# Patient Record
Sex: FEMALE | Race: WHITE | NOT HISPANIC OR LATINO | URBAN - METROPOLITAN AREA
[De-identification: names, ages, dates, MRNs, and addresses within clinical notes are randomized per-mention and may not be internally consistent; named-entity substitution may affect disease eponyms.]

---

## 2017-05-02 ENCOUNTER — INPATIENT (INPATIENT)
Facility: HOSPITAL | Age: 27
LOS: 2 days | Discharge: HOME | End: 2017-05-05
Attending: OBSTETRICS & GYNECOLOGY | Admitting: OBSTETRICS & GYNECOLOGY

## 2017-06-28 DIAGNOSIS — O48.0 POST-TERM PREGNANCY: ICD-10-CM

## 2017-06-28 DIAGNOSIS — Z33.1 PREGNANT STATE, INCIDENTAL: ICD-10-CM

## 2017-06-28 DIAGNOSIS — Z3A.40 40 WEEKS GESTATION OF PREGNANCY: ICD-10-CM

## 2018-03-28 ENCOUNTER — TRANSCRIPTION ENCOUNTER (OUTPATIENT)
Age: 28
End: 2018-03-28

## 2019-05-08 ENCOUNTER — APPOINTMENT (OUTPATIENT)
Dept: ANTEPARTUM | Facility: CLINIC | Age: 29
End: 2019-05-08
Payer: COMMERCIAL

## 2019-05-08 ENCOUNTER — TRANSCRIPTION ENCOUNTER (OUTPATIENT)
Age: 29
End: 2019-05-08

## 2019-05-08 ENCOUNTER — OUTPATIENT (OUTPATIENT)
Dept: OUTPATIENT SERVICES | Facility: HOSPITAL | Age: 29
LOS: 1 days | Discharge: HOME | End: 2019-05-08

## 2019-05-08 DIAGNOSIS — Z36.3 ENCOUNTER FOR ANTENATAL SCREENING FOR MALFORMATIONS: ICD-10-CM

## 2019-05-08 PROCEDURE — 76811 OB US DETAILED SNGL FETUS: CPT | Mod: 26

## 2019-09-23 ENCOUNTER — INPATIENT (INPATIENT)
Facility: HOSPITAL | Age: 29
LOS: 2 days | Discharge: HOME | End: 2019-09-26
Attending: OBSTETRICS & GYNECOLOGY | Admitting: OBSTETRICS & GYNECOLOGY

## 2019-09-23 VITALS — DIASTOLIC BLOOD PRESSURE: 79 MMHG | SYSTOLIC BLOOD PRESSURE: 124 MMHG | HEART RATE: 95 BPM | OXYGEN SATURATION: 97 %

## 2019-09-23 DIAGNOSIS — Z98.890 OTHER SPECIFIED POSTPROCEDURAL STATES: Chronic | ICD-10-CM

## 2019-09-23 LAB
APPEARANCE UR: ABNORMAL
BACTERIA # UR AUTO: ABNORMAL
BASOPHILS # BLD AUTO: 0.03 K/UL — SIGNIFICANT CHANGE UP (ref 0–0.2)
BASOPHILS NFR BLD AUTO: 0.3 % — SIGNIFICANT CHANGE UP (ref 0–1)
BILIRUB UR-MCNC: NEGATIVE — SIGNIFICANT CHANGE UP
BLD GP AB SCN SERPL QL: SIGNIFICANT CHANGE UP
COLOR SPEC: YELLOW — SIGNIFICANT CHANGE UP
DIFF PNL FLD: NEGATIVE — SIGNIFICANT CHANGE UP
EOSINOPHIL # BLD AUTO: 0.13 K/UL — SIGNIFICANT CHANGE UP (ref 0–0.7)
EOSINOPHIL NFR BLD AUTO: 1.4 % — SIGNIFICANT CHANGE UP (ref 0–8)
EPI CELLS # UR: >27 /HPF — HIGH (ref 0–5)
GLUCOSE UR QL: NEGATIVE — SIGNIFICANT CHANGE UP
HCT VFR BLD CALC: 37.2 % — SIGNIFICANT CHANGE UP (ref 37–47)
HGB BLD-MCNC: 12.6 G/DL — SIGNIFICANT CHANGE UP (ref 12–16)
HYALINE CASTS # UR AUTO: 6 /LPF — SIGNIFICANT CHANGE UP (ref 0–7)
IMM GRANULOCYTES NFR BLD AUTO: 1.2 % — HIGH (ref 0.1–0.3)
KETONES UR-MCNC: NEGATIVE — SIGNIFICANT CHANGE UP
LEUKOCYTE ESTERASE UR-ACNC: NEGATIVE — SIGNIFICANT CHANGE UP
LYMPHOCYTES # BLD AUTO: 3.14 K/UL — SIGNIFICANT CHANGE UP (ref 1.2–3.4)
LYMPHOCYTES # BLD AUTO: 33.9 % — SIGNIFICANT CHANGE UP (ref 20.5–51.1)
MCHC RBC-ENTMCNC: 29.5 PG — SIGNIFICANT CHANGE UP (ref 27–31)
MCHC RBC-ENTMCNC: 33.9 G/DL — SIGNIFICANT CHANGE UP (ref 32–37)
MCV RBC AUTO: 87.1 FL — SIGNIFICANT CHANGE UP (ref 81–99)
MONOCYTES # BLD AUTO: 0.58 K/UL — SIGNIFICANT CHANGE UP (ref 0.1–0.6)
MONOCYTES NFR BLD AUTO: 6.3 % — SIGNIFICANT CHANGE UP (ref 1.7–9.3)
NEUTROPHILS # BLD AUTO: 5.26 K/UL — SIGNIFICANT CHANGE UP (ref 1.4–6.5)
NEUTROPHILS NFR BLD AUTO: 56.9 % — SIGNIFICANT CHANGE UP (ref 42.2–75.2)
NITRITE UR-MCNC: NEGATIVE — SIGNIFICANT CHANGE UP
NRBC # BLD: 0 /100 WBCS — SIGNIFICANT CHANGE UP (ref 0–0)
PH UR: 7 — SIGNIFICANT CHANGE UP (ref 5–8)
PLATELET # BLD AUTO: 178 K/UL — SIGNIFICANT CHANGE UP (ref 130–400)
PRENATAL SYPHILIS TEST: SIGNIFICANT CHANGE UP
PROT UR-MCNC: SIGNIFICANT CHANGE UP
RBC # BLD: 4.27 M/UL — SIGNIFICANT CHANGE UP (ref 4.2–5.4)
RBC # FLD: 13.5 % — SIGNIFICANT CHANGE UP (ref 11.5–14.5)
RBC CASTS # UR COMP ASSIST: 1 /HPF — SIGNIFICANT CHANGE UP (ref 0–4)
SP GR SPEC: 1.02 — SIGNIFICANT CHANGE UP (ref 1.01–1.02)
UROBILINOGEN FLD QL: SIGNIFICANT CHANGE UP
WBC # BLD: 9.25 K/UL — SIGNIFICANT CHANGE UP (ref 4.8–10.8)
WBC # FLD AUTO: 9.25 K/UL — SIGNIFICANT CHANGE UP (ref 4.8–10.8)
WBC UR QL: 7 /HPF — HIGH (ref 0–5)

## 2019-09-23 RX ORDER — SODIUM CHLORIDE 9 MG/ML
1000 INJECTION, SOLUTION INTRAVENOUS
Refills: 0 | Status: DISCONTINUED | OUTPATIENT
Start: 2019-09-23 | End: 2019-09-24

## 2019-09-23 RX ORDER — SODIUM CHLORIDE 0.65 %
1 AEROSOL, SPRAY (ML) NASAL
Refills: 0 | Status: DISCONTINUED | OUTPATIENT
Start: 2019-09-23 | End: 2019-09-26

## 2019-09-23 RX ORDER — DINOPROSTONE 10 MG/241MG
10 INSERT VAGINAL ONCE
Refills: 0 | Status: COMPLETED | OUTPATIENT
Start: 2019-09-23 | End: 2019-09-23

## 2019-09-23 RX ORDER — OXYTOCIN 10 UNIT/ML
333.33 VIAL (ML) INJECTION
Qty: 20 | Refills: 0 | Status: COMPLETED | OUTPATIENT
Start: 2019-09-23 | End: 2019-09-24

## 2019-09-23 RX ORDER — OXYTOCIN 10 UNIT/ML
1 VIAL (ML) INJECTION
Qty: 30 | Refills: 0 | Status: DISCONTINUED | OUTPATIENT
Start: 2019-09-23 | End: 2019-09-24

## 2019-09-23 RX ADMIN — SODIUM CHLORIDE 125 MILLILITER(S): 9 INJECTION, SOLUTION INTRAVENOUS at 22:07

## 2019-09-23 RX ADMIN — Medication 1 SPRAY(S): at 22:21

## 2019-09-23 RX ADMIN — DINOPROSTONE 10 MILLIGRAM(S): 10 INSERT VAGINAL at 21:50

## 2019-09-23 NOTE — OB PROVIDER H&P - ASSESSMENT
30yo  at 40w2d by LMP c/w first trim sono per patient, GBS neg per patient, here for elective IOL elective, left fetal pyelectasis, HSV I pos serology no outbreaks    - Admit  - IV fluids  - Clears  - Cont toco and efm  - Pain mgt prn  - Admission labs  - Bedrest with bathroom privileges    Dr. Briceño to be made aware

## 2019-09-23 NOTE — OB PROVIDER H&P - HISTORY OF PRESENT ILLNESS
30yo  at 40w2d here for IOL elective, GBS neg. Denies ctx, vb, lof. Good FM. Fetus was found to have left pyelectasis this pregnancy, no other complications. 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

## 2019-09-23 NOTE — OB PROVIDER H&P - NSHPPHYSICALEXAM_GEN_ALL_CORE
Vital Signs Last 24 Hrs  T(C): 36.9 (23 Sep 2019 21:00), Max: 36.9 (23 Sep 2019 21:00)  T(F): 98.4 (23 Sep 2019 21:00), Max: 98.4 (23 Sep 2019 21:00)  HR: 90 (23 Sep 2019 21:20) (81 - 92)  BP: 124/79 (23 Sep 2019 21:00) (124/79 - 124/79)  RR: 18 (23 Sep 2019 21:00) (18 - 18)  SpO2: 97% (23 Sep 2019 21:20) (97% - 99%)    Gen: NAD, A&OX3  Abd: Gravid, soft, NT  VE: 1.5/70/-2, vtx, intact

## 2019-09-23 NOTE — CHART NOTE - NSCHARTNOTEFT_GEN_A_CORE
Monday 09/23/2019  22:02    Patient's chart, notes and labs reviewed.  IOL with Cervidil.                        12.6   9.25  )-----------( 178      ( 23 Sep 2019 21:00 )             37.2   Admission labs acceptable for consideration of Neuraxial Nerve Block for Labor Analgesia/Anesthesia.  Will remain immediately available.

## 2019-09-23 NOTE — OB RN PATIENT PROFILE - FAMILY HISTORY
Mother  Still living? Unknown  Family history of diabetes mellitus, Age at diagnosis: Age Unknown     Grandparent  Still living? Unknown  Family history of skin cancer, Age at diagnosis: Age Unknown

## 2019-09-23 NOTE — OB PROVIDER H&P - NS_OBGYNHISTORY_OBGYN_ALL_OB_FT
OB:  G1 5/3/17 40w6d  Male 5ym47rm [facility] uncomplicated    GYN:   Denies abnormal paps, fibroids, cysts, STIs

## 2019-09-24 LAB
AMPHET UR-MCNC: NEGATIVE — SIGNIFICANT CHANGE UP
BARBITURATES UR SCN-MCNC: NEGATIVE — SIGNIFICANT CHANGE UP
BENZODIAZ UR-MCNC: NEGATIVE — SIGNIFICANT CHANGE UP
BUPRENORPHINE SCREEN, URINE RESULT: NEGATIVE — SIGNIFICANT CHANGE UP
COCAINE METAB.OTHER UR-MCNC: NEGATIVE — SIGNIFICANT CHANGE UP
HIV 1+2 AB+HIV1 P24 AG SERPL QL IA: SIGNIFICANT CHANGE UP
L&D DRUG SCREEN, URINE: SIGNIFICANT CHANGE UP
METHADONE UR-MCNC: NEGATIVE — SIGNIFICANT CHANGE UP
OPIATES UR-MCNC: NEGATIVE — SIGNIFICANT CHANGE UP
OXYCODONE UR-MCNC: NEGATIVE — SIGNIFICANT CHANGE UP
PCP UR-MCNC: NEGATIVE — SIGNIFICANT CHANGE UP
PROPOXYPHENE QUALITATIVE URINE RESULT: NEGATIVE — SIGNIFICANT CHANGE UP

## 2019-09-24 RX ORDER — IBUPROFEN 200 MG
600 TABLET ORAL EVERY 6 HOURS
Refills: 0 | Status: COMPLETED | OUTPATIENT
Start: 2019-09-24 | End: 2020-08-22

## 2019-09-24 RX ORDER — NALOXONE HYDROCHLORIDE 4 MG/.1ML
0.1 SPRAY NASAL
Refills: 0 | Status: DISCONTINUED | OUTPATIENT
Start: 2019-09-24 | End: 2019-09-26

## 2019-09-24 RX ORDER — LANOLIN
1 OINTMENT (GRAM) TOPICAL EVERY 6 HOURS
Refills: 0 | Status: DISCONTINUED | OUTPATIENT
Start: 2019-09-24 | End: 2019-09-26

## 2019-09-24 RX ORDER — HYDROCORTISONE 1 %
1 OINTMENT (GRAM) TOPICAL EVERY 6 HOURS
Refills: 0 | Status: DISCONTINUED | OUTPATIENT
Start: 2019-09-24 | End: 2019-09-26

## 2019-09-24 RX ORDER — OXYCODONE HYDROCHLORIDE 5 MG/1
5 TABLET ORAL ONCE
Refills: 0 | Status: DISCONTINUED | OUTPATIENT
Start: 2019-09-24 | End: 2019-09-26

## 2019-09-24 RX ORDER — PRAMOXINE HYDROCHLORIDE 150 MG/15G
1 AEROSOL, FOAM RECTAL EVERY 4 HOURS
Refills: 0 | Status: DISCONTINUED | OUTPATIENT
Start: 2019-09-24 | End: 2019-09-26

## 2019-09-24 RX ORDER — KETOROLAC TROMETHAMINE 30 MG/ML
30 SYRINGE (ML) INJECTION ONCE
Refills: 0 | Status: DISCONTINUED | OUTPATIENT
Start: 2019-09-24 | End: 2019-09-24

## 2019-09-24 RX ORDER — GLYCERIN ADULT
1 SUPPOSITORY, RECTAL RECTAL AT BEDTIME
Refills: 0 | Status: DISCONTINUED | OUTPATIENT
Start: 2019-09-24 | End: 2019-09-26

## 2019-09-24 RX ORDER — DOCUSATE SODIUM 100 MG
100 CAPSULE ORAL
Refills: 0 | Status: DISCONTINUED | OUTPATIENT
Start: 2019-09-24 | End: 2019-09-26

## 2019-09-24 RX ORDER — BENZOCAINE 10 %
1 GEL (GRAM) MUCOUS MEMBRANE EVERY 6 HOURS
Refills: 0 | Status: DISCONTINUED | OUTPATIENT
Start: 2019-09-24 | End: 2019-09-26

## 2019-09-24 RX ORDER — MAGNESIUM HYDROXIDE 400 MG/1
30 TABLET, CHEWABLE ORAL
Refills: 0 | Status: DISCONTINUED | OUTPATIENT
Start: 2019-09-24 | End: 2019-09-26

## 2019-09-24 RX ORDER — DIBUCAINE 1 %
1 OINTMENT (GRAM) RECTAL EVERY 6 HOURS
Refills: 0 | Status: DISCONTINUED | OUTPATIENT
Start: 2019-09-24 | End: 2019-09-26

## 2019-09-24 RX ORDER — IBUPROFEN 200 MG
600 TABLET ORAL EVERY 6 HOURS
Refills: 0 | Status: DISCONTINUED | OUTPATIENT
Start: 2019-09-24 | End: 2019-09-26

## 2019-09-24 RX ORDER — OXYCODONE HYDROCHLORIDE 5 MG/1
5 TABLET ORAL
Refills: 0 | Status: DISCONTINUED | OUTPATIENT
Start: 2019-09-24 | End: 2019-09-26

## 2019-09-24 RX ORDER — DIPHENHYDRAMINE HCL 50 MG
25 CAPSULE ORAL EVERY 6 HOURS
Refills: 0 | Status: DISCONTINUED | OUTPATIENT
Start: 2019-09-24 | End: 2019-09-26

## 2019-09-24 RX ORDER — ACETAMINOPHEN 500 MG
975 TABLET ORAL
Refills: 0 | Status: DISCONTINUED | OUTPATIENT
Start: 2019-09-24 | End: 2019-09-26

## 2019-09-24 RX ORDER — AER TRAVELER 0.5 G/1
1 SOLUTION RECTAL; TOPICAL EVERY 4 HOURS
Refills: 0 | Status: DISCONTINUED | OUTPATIENT
Start: 2019-09-24 | End: 2019-09-26

## 2019-09-24 RX ORDER — SIMETHICONE 80 MG/1
80 TABLET, CHEWABLE ORAL EVERY 4 HOURS
Refills: 0 | Status: DISCONTINUED | OUTPATIENT
Start: 2019-09-24 | End: 2019-09-26

## 2019-09-24 RX ORDER — SODIUM CHLORIDE 9 MG/ML
3 INJECTION INTRAMUSCULAR; INTRAVENOUS; SUBCUTANEOUS EVERY 8 HOURS
Refills: 0 | Status: DISCONTINUED | OUTPATIENT
Start: 2019-09-24 | End: 2019-09-26

## 2019-09-24 RX ORDER — ONDANSETRON 8 MG/1
4 TABLET, FILM COATED ORAL EVERY 6 HOURS
Refills: 0 | Status: DISCONTINUED | OUTPATIENT
Start: 2019-09-24 | End: 2019-09-26

## 2019-09-24 RX ADMIN — Medication 1000 MILLIUNIT(S)/MIN: at 10:18

## 2019-09-24 RX ADMIN — SODIUM CHLORIDE 3 MILLILITER(S): 9 INJECTION INTRAMUSCULAR; INTRAVENOUS; SUBCUTANEOUS at 21:56

## 2019-09-24 RX ADMIN — Medication 975 MILLIGRAM(S): at 21:56

## 2019-09-24 RX ADMIN — SODIUM CHLORIDE 3 MILLILITER(S): 9 INJECTION INTRAMUSCULAR; INTRAVENOUS; SUBCUTANEOUS at 13:12

## 2019-09-24 RX ADMIN — Medication 975 MILLIGRAM(S): at 13:12

## 2019-09-24 RX ADMIN — Medication 30 MILLIGRAM(S): at 10:34

## 2019-09-24 RX ADMIN — SODIUM CHLORIDE 125 MILLILITER(S): 9 INJECTION, SOLUTION INTRAVENOUS at 02:13

## 2019-09-24 RX ADMIN — Medication 100 MILLIGRAM(S): at 21:57

## 2019-09-24 RX ADMIN — Medication 30 MILLIGRAM(S): at 11:23

## 2019-09-24 RX ADMIN — Medication 600 MILLIGRAM(S): at 18:20

## 2019-09-24 NOTE — PROGRESS NOTE ADULT - SUBJECTIVE AND OBJECTIVE BOX
PGY 1 Note    Patient seen at bedside for evaluation of labor progression.  Reports very painful contractions and desire epidural for pain management.  Denies any other symptoms.    ICU Vital Signs Last 24 Hrs  T(C): 36.4 (24 Sep 2019 01:47), Max: 36.9 (23 Sep 2019 21:00)  T(F): 97.52 (24 Sep 2019 01:47), Max: 98.4 (23 Sep 2019 21:00)  HR: 92 (24 Sep 2019 04:21) (49 - 102)  BP: 128/84 (24 Sep 2019 04:20) (98/53 - 133/84)  RR: 18 (24 Sep 2019 01:47) (18 - 18)  SpO2: 99% (24 Sep 2019 04:21) (96% - 100%)      EFM: 110/mod/+accel, cat I  TOCO: q2m  SVE: 3.5/90/-2, vertex, intact    Labs:                        12.6   9.25  )-----------( 178      ( 23 Sep 2019 21:00 )             37.2           ABO RH Interpretation: A POS (19 @ 21:00)  antibody neg  Urinalysis Basic - ( 23 Sep 2019 21:30 )    Color: Yellow / Appearance: Slightly Turbid / S.019 / pH: x  Gluc: x / Ketone: Negative  / Bili: Negative / Urobili: <2 mg/dL   Blood: x / Protein: Trace / Nitrite: Negative   Leuk Esterase: Negative / RBC: 1 /HPF / WBC 7 /HPF   Sq Epi: x / Non Sq Epi: >27 /HPF / Bacteria: Moderate    Syphilis NR      Meds: cervidil in @2720, removed @7924

## 2019-09-24 NOTE — PROGRESS NOTE ADULT - SUBJECTIVE AND OBJECTIVE BOX
PGY2 L&D Progress Note    Patient seen at bedside, doing well, no complaints.     T(F): 97.88 ( @ 05:44), Max: 98.4 ( @ 21:00)  HR: 93 ( @ 08:18)  BP: 97/54 ( @ 08:16) (96/56 - 133/84)  RR: 18 ( @ 05:44)  EFM: 115/mod/accel+  TOCO: q1-5min  SVE: deferred, last exam at 0800, 5/80/-1, intact, vertex as per CARLY Jaramillo's exam    Medications:  cervidil in at 2150 on 19, out at 0419 on 19  epidural started at 0520    Labs:                        12.6   9.25  )-----------( 178      ( 23 Sep 2019 21:00 )             37.2       Prenatal Syphilis Test: Nonreact ( @ 21:00)  Type and Screen: A POS (19 @ 21:00)  Antibody Screen: NEG (19 @ 21:00)    Urinalysis Basic - ( 23 Sep 2019 21:30 )  Color: Yellow / Appearance: Slightly Turbid / S.019 / pH: x  Gluc: x / Ketone: Negative  / Bili: Negative / Urobili: <2 mg/dL   Blood: x / Protein: Trace / Nitrite: Negative   Leuk Esterase: Negative / RBC: 1 /HPF / WBC 7 /HPF   Sq Epi: x / Non Sq Epi: >27 /HPF / Bacteria: Moderate    A/P:   28yo  at 40w3d, GBS neg, elective IOL w/ cervidil, w/ left fetal pyelectasis, HSV I pos serology no outbreaks/lesions, s/p cervidil, s/p epidural, in labor,     -pain management prn  -cont efm/toco  -f/u pending labs (HIV and UDS)  -cont to monitor vitals  -cont iv hydration    Dr. Olivas and Dr. Briceño to be made aware.

## 2019-09-24 NOTE — PROGRESS NOTE ADULT - ASSESSMENT
A/P: 30yo  at 40w2d by LMP c/w first trim sono per patient, GBS neg per patient, here for elective IOL, left fetal pyelectasis, HSV I pos serology no outbreaks  -remove cervidil, watch toco and if inadequate contractions then start pitocin for IOL  -cont EFM/toco  -clear liquid diet, IVF  -pain management with epidural  -f/u UDS, HIV    Dr. Dale and Dr. Briceño  aware.

## 2019-09-24 NOTE — OB PROVIDER DELIVERY SUMMARY - NSLACERATRAPAIRDETAILS_OBGYN_ALL_OB_FT
midposterior perineal laceration 2nd degree repaired with 2-0 chromic suture in standard fashion without difficulty. Excellent hemostasis noted. Patient tolerated well.

## 2019-09-24 NOTE — OB RN DELIVERY SUMMARY - NS_SEPSISRSKCALC_OBGYN_ALL_OB_FT
EOS calculated successfully. EOS Risk Factor: 0.5/1000 live births ([facility] national incidence); GA=40w3d; Temp=98.4; ROM=0.883; GBS='Negative'; Antibiotics='No antibiotics or any antibiotics < 2 hrs prior to birth'

## 2019-09-24 NOTE — PROCEDURE NOTE - SUPERVISORY STATEMENT
Epidural infusion:  0.1% Bupivacaine PLAIN  Rate 15 ml/hr  Sensory T8 (Rt = Lt), Motor intact, able to flex b/l knees w/o any difficulty upon command  NO Narcotic administered in LD or [place]  Pt comfortable

## 2019-09-25 LAB
BASOPHILS # BLD AUTO: 0.06 K/UL — SIGNIFICANT CHANGE UP (ref 0–0.2)
BASOPHILS NFR BLD AUTO: 0.5 % — SIGNIFICANT CHANGE UP (ref 0–1)
EOSINOPHIL # BLD AUTO: 0.19 K/UL — SIGNIFICANT CHANGE UP (ref 0–0.7)
EOSINOPHIL NFR BLD AUTO: 1.5 % — SIGNIFICANT CHANGE UP (ref 0–8)
HCT VFR BLD CALC: 36.3 % — LOW (ref 37–47)
HGB BLD-MCNC: 12.2 G/DL — SIGNIFICANT CHANGE UP (ref 12–16)
IMM GRANULOCYTES NFR BLD AUTO: 0.7 % — HIGH (ref 0.1–0.3)
LYMPHOCYTES # BLD AUTO: 31.1 % — SIGNIFICANT CHANGE UP (ref 20.5–51.1)
LYMPHOCYTES # BLD AUTO: 4.02 K/UL — HIGH (ref 1.2–3.4)
MCHC RBC-ENTMCNC: 29.8 PG — SIGNIFICANT CHANGE UP (ref 27–31)
MCHC RBC-ENTMCNC: 33.6 G/DL — SIGNIFICANT CHANGE UP (ref 32–37)
MCV RBC AUTO: 88.8 FL — SIGNIFICANT CHANGE UP (ref 81–99)
MONOCYTES # BLD AUTO: 0.73 K/UL — HIGH (ref 0.1–0.6)
MONOCYTES NFR BLD AUTO: 5.6 % — SIGNIFICANT CHANGE UP (ref 1.7–9.3)
NEUTROPHILS # BLD AUTO: 7.85 K/UL — HIGH (ref 1.4–6.5)
NEUTROPHILS NFR BLD AUTO: 60.6 % — SIGNIFICANT CHANGE UP (ref 42.2–75.2)
NRBC # BLD: 0 /100 WBCS — SIGNIFICANT CHANGE UP (ref 0–0)
PLATELET # BLD AUTO: 178 K/UL — SIGNIFICANT CHANGE UP (ref 130–400)
RBC # BLD: 4.09 M/UL — LOW (ref 4.2–5.4)
RBC # FLD: 14 % — SIGNIFICANT CHANGE UP (ref 11.5–14.5)
WBC # BLD: 12.94 K/UL — HIGH (ref 4.8–10.8)
WBC # FLD AUTO: 12.94 K/UL — HIGH (ref 4.8–10.8)

## 2019-09-25 RX ADMIN — Medication 600 MILLIGRAM(S): at 12:30

## 2019-09-25 RX ADMIN — Medication 600 MILLIGRAM(S): at 00:13

## 2019-09-25 RX ADMIN — Medication 975 MILLIGRAM(S): at 21:35

## 2019-09-25 RX ADMIN — Medication 600 MILLIGRAM(S): at 17:40

## 2019-09-25 RX ADMIN — Medication 600 MILLIGRAM(S): at 06:29

## 2019-09-25 RX ADMIN — Medication 100 MILLIGRAM(S): at 21:36

## 2019-09-25 RX ADMIN — SODIUM CHLORIDE 3 MILLILITER(S): 9 INJECTION INTRAMUSCULAR; INTRAVENOUS; SUBCUTANEOUS at 21:26

## 2019-09-26 ENCOUNTER — TRANSCRIPTION ENCOUNTER (OUTPATIENT)
Age: 29
End: 2019-09-26

## 2019-09-26 VITALS
TEMPERATURE: 97 F | SYSTOLIC BLOOD PRESSURE: 129 MMHG | HEART RATE: 75 BPM | DIASTOLIC BLOOD PRESSURE: 78 MMHG | RESPIRATION RATE: 18 BRPM

## 2019-09-26 LAB
BASOPHILS # BLD AUTO: 0.06 K/UL — SIGNIFICANT CHANGE UP (ref 0–0.2)
BASOPHILS NFR BLD AUTO: 0.6 % — SIGNIFICANT CHANGE UP (ref 0–1)
EOSINOPHIL # BLD AUTO: 0.33 K/UL — SIGNIFICANT CHANGE UP (ref 0–0.7)
EOSINOPHIL NFR BLD AUTO: 3.4 % — SIGNIFICANT CHANGE UP (ref 0–8)
HCT VFR BLD CALC: 33 % — LOW (ref 37–47)
HGB BLD-MCNC: 11 G/DL — LOW (ref 12–16)
IMM GRANULOCYTES NFR BLD AUTO: 1 % — HIGH (ref 0.1–0.3)
LYMPHOCYTES # BLD AUTO: 3.35 K/UL — SIGNIFICANT CHANGE UP (ref 1.2–3.4)
LYMPHOCYTES # BLD AUTO: 34.7 % — SIGNIFICANT CHANGE UP (ref 20.5–51.1)
MCHC RBC-ENTMCNC: 29.7 PG — SIGNIFICANT CHANGE UP (ref 27–31)
MCHC RBC-ENTMCNC: 33.3 G/DL — SIGNIFICANT CHANGE UP (ref 32–37)
MCV RBC AUTO: 89.2 FL — SIGNIFICANT CHANGE UP (ref 81–99)
MONOCYTES # BLD AUTO: 0.62 K/UL — HIGH (ref 0.1–0.6)
MONOCYTES NFR BLD AUTO: 6.4 % — SIGNIFICANT CHANGE UP (ref 1.7–9.3)
NEUTROPHILS # BLD AUTO: 5.19 K/UL — SIGNIFICANT CHANGE UP (ref 1.4–6.5)
NEUTROPHILS NFR BLD AUTO: 53.9 % — SIGNIFICANT CHANGE UP (ref 42.2–75.2)
NRBC # BLD: 0 /100 WBCS — SIGNIFICANT CHANGE UP (ref 0–0)
PLATELET # BLD AUTO: 150 K/UL — SIGNIFICANT CHANGE UP (ref 130–400)
RBC # BLD: 3.7 M/UL — LOW (ref 4.2–5.4)
RBC # FLD: 13.8 % — SIGNIFICANT CHANGE UP (ref 11.5–14.5)
WBC # BLD: 9.65 K/UL — SIGNIFICANT CHANGE UP (ref 4.8–10.8)
WBC # FLD AUTO: 9.65 K/UL — SIGNIFICANT CHANGE UP (ref 4.8–10.8)

## 2019-09-26 RX ORDER — SIMETHICONE 80 MG/1
1 TABLET, CHEWABLE ORAL
Qty: 0 | Refills: 0 | DISCHARGE
Start: 2019-09-26

## 2019-09-26 RX ORDER — IBUPROFEN 200 MG
1 TABLET ORAL
Qty: 0 | Refills: 0 | DISCHARGE
Start: 2019-09-26

## 2019-09-26 RX ORDER — DOCUSATE SODIUM 100 MG
1 CAPSULE ORAL
Qty: 0 | Refills: 0 | DISCHARGE
Start: 2019-09-26

## 2019-09-26 RX ORDER — ACETAMINOPHEN 500 MG
3 TABLET ORAL
Qty: 0 | Refills: 0 | DISCHARGE
Start: 2019-09-26

## 2019-09-26 RX ADMIN — Medication 600 MILLIGRAM(S): at 06:13

## 2019-09-26 RX ADMIN — Medication 600 MILLIGRAM(S): at 12:00

## 2019-09-26 RX ADMIN — Medication 600 MILLIGRAM(S): at 11:21

## 2019-09-26 NOTE — PROGRESS NOTE ADULT - SUBJECTIVE AND OBJECTIVE BOX
PGY 2 Post Partum note    Subjective: Pt seen and examined at bedside. Doing well, pain controlled. Pt is voiding without difficulty, passing flatus and had BM, tolerating regular diet, ambulating, breast/bottle feeding. Denies CP, SOB, N/V, LE pain.    Physical exam:    Vital Signs Last 24 Hrs  T(F): 96.6 (26 Sep 2019 00:19), Max: 97.7 (25 Sep 2019 09:15)  HR: 73 (26 Sep 2019 00:19) (73 - 83)  BP: 107/61 (26 Sep 2019 00:19) (107/61 - 117/76)  RR: 19 (26 Sep 2019 00:19) (18 - 19)    Gen: NAD  CVS: s1s2, rrr  Lungs: ctab, no r/r/w  Abdomen: Soft, appropriately tender, no distension , firm uterine fundus below umbilicus, +BS  Pelvic: Normal lochia rubra noted  Ext: No calf tenderness    Diet: Regular  meds:   acetaminophen   Tablet ..   975 milliGRAM(s) Oral (09-25-19 @ 21:35)    docusate sodium   100 milliGRAM(s) Oral (09-25-19 @ 21:36)    ibuprofen  Tablet.   600 milliGRAM(s) Oral (09-26-19 @ 06:13)   600 milliGRAM(s) Oral (09-25-19 @ 17:40)   600 milliGRAM(s) Oral (09-25-19 @ 12:30)   600 milliGRAM(s) Oral (09-25-19 @ 06:29)        LABS:                        12.2   12.94 )-----------( 178      ( 25 Sep 2019 18:28 )             36.3

## 2019-09-26 NOTE — PROGRESS NOTE ADULT - ASSESSMENT
30 yo now , s/p , PPD2, doing well.    - pain management PRN  - c/w regular diet, PO hydration  - monitor vitals, bleeding  - encourage ambulation  - anticipate d/c today    Dr. Olivas aware and Dr. Briceño to be made aware.

## 2019-09-26 NOTE — DISCHARGE NOTE OB - PATIENT PORTAL LINK FT
You can access the FollowMyHealth Patient Portal offered by [facility] by registering at the following website: [email or web address]. By joining [employer]’s FollowMyHealth portal, you will also be able to view your health information using other applications (apps) compatible with our system.

## 2019-09-26 NOTE — DISCHARGE NOTE OB - PLAN OF CARE
Pt calls back, she spoke to insurance Reclast is covered, but they were not sure about Boniva.     Informed pt that Reclast was Dr. Riley's preference for treatment.  Pt would like to discuss Reclast with Dr. Riley before we place the order. Requesting a call back from Dr. Riley.   happy and healthy mother and baby Nothing in the vagina for 6 weeks (no sex, no tampons, no douching). Avoid tub baths, you may shower.    If you have a fever of 100.4F or greater, severe vaginal bleeding, or severe abdominal pain, call your Ob/Gyn or come to the emergency department immediately.

## 2019-09-26 NOTE — DISCHARGE NOTE OB - HOSPITAL COURSE
09-26-19 @ 06:30    HPI:  28yo  at 40w2d here for IOL elective, GBS neg. Denies ctx, vb, lof. Good FM. Fetus was found to have left pyelectasis this pregnancy, no other complications. 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 (23 Sep 2019 21:12)      PAST MEDICAL & SURGICAL HISTORY:  Heart murmur  S/P ORIF (open reduction internal fixation) fracture: left hip      POST PARTUM COURSE:   uncomplicated labor and postpartum course discharged PPD2       LABS:                        12.2   12.94 )-----------( 178      ( 25 Sep 2019 18:28 )             36.3                   Allergies    penicillin (Other)    Intolerances

## 2019-09-26 NOTE — DISCHARGE NOTE OB - CARE PROVIDER_API CALL
Hank Briceño)  Obstetrics and Gynecology  [facility]– [address] [place] [address]  Phone: [phone]  Fax: [phone]  Follow Up Time:

## 2019-09-30 DIAGNOSIS — B00.9 HERPESVIRAL INFECTION, UNSPECIFIED: ICD-10-CM

## 2019-09-30 DIAGNOSIS — Z3A.40 40 WEEKS GESTATION OF PREGNANCY: ICD-10-CM

## 2019-09-30 DIAGNOSIS — O35.8XX0 MATERNAL CARE FOR OTHER (SUSPECTED) FETAL ABNORMALITY AND DAMAGE, NOT APPLICABLE OR UNSPECIFIED: ICD-10-CM

## 2020-04-26 ENCOUNTER — MESSAGE (OUTPATIENT)
Age: 30
End: 2020-04-26

## 2020-05-08 PROBLEM — R01.1 CARDIAC MURMUR, UNSPECIFIED: Chronic | Status: ACTIVE | Noted: 2019-09-23

## 2020-05-11 ENCOUNTER — APPOINTMENT (OUTPATIENT)
Dept: DISASTER EMERGENCY | Facility: HOSPITAL | Age: 30
End: 2020-05-11

## 2020-05-13 LAB
SARS-COV-2 IGG SERPL IA-ACNC: <0.1 INDEX
SARS-COV-2 IGG SERPL QL IA: NEGATIVE

## 2021-03-29 DIAGNOSIS — Z23 ENCOUNTER FOR IMMUNIZATION: ICD-10-CM

## 2021-03-30 LAB
COVID-19 SPIKE DOMAIN ANTIBODY INTERPRETATION: POSITIVE
SARS-COV-2 AB SERPL IA-ACNC: >250 U/ML

## 2021-06-18 RX ORDER — NORETHINDRONE AND ETHINYL ESTRADIOL 0.4-0.035
1 KIT ORAL
Qty: 28 | Refills: 2
Start: 2021-06-18 | End: 2021-09-09

## 2021-09-17 NOTE — PROGRESS NOTE ADULT - PROVIDER SPECIALTY LIST ADULT
Chief Complaint   Patient presents with    Follow-up     headaches        Health Maintenance Due   Topic Date Due    Influenza Vaccine (1) 09/01/2021       Patient is due for topics as listed above but is not proceeding with Immunization(s) Influenza at this time.            OB 6

## 2021-12-07 ENCOUNTER — LABORATORY RESULT (OUTPATIENT)
Age: 31
End: 2021-12-07

## 2021-12-07 ENCOUNTER — NON-APPOINTMENT (OUTPATIENT)
Age: 31
End: 2021-12-07

## 2021-12-08 ENCOUNTER — APPOINTMENT (OUTPATIENT)
Dept: OBGYN | Facility: CLINIC | Age: 31
End: 2021-12-08
Payer: COMMERCIAL

## 2021-12-08 ENCOUNTER — NON-APPOINTMENT (OUTPATIENT)
Age: 31
End: 2021-12-08

## 2021-12-08 ENCOUNTER — LABORATORY RESULT (OUTPATIENT)
Age: 31
End: 2021-12-08

## 2021-12-08 DIAGNOSIS — T83.32XA DISPLACEMENT OF INTRAUTERINE CONTRACEPTIVE DEVICE, INITIAL ENCOUNTER: ICD-10-CM

## 2021-12-08 PROCEDURE — 99385 PREV VISIT NEW AGE 18-39: CPT | Mod: 25

## 2021-12-08 PROCEDURE — 76830 TRANSVAGINAL US NON-OB: CPT

## 2021-12-08 PROCEDURE — 58301 REMOVE INTRAUTERINE DEVICE: CPT

## 2021-12-10 ENCOUNTER — LABORATORY RESULT (OUTPATIENT)
Age: 31
End: 2021-12-10

## 2021-12-14 ENCOUNTER — APPOINTMENT (OUTPATIENT)
Dept: OBGYN | Facility: CLINIC | Age: 31
End: 2021-12-14
Payer: COMMERCIAL

## 2021-12-14 VITALS — WEIGHT: 125 LBS | HEIGHT: 68 IN | BODY MASS INDEX: 18.94 KG/M2 | TEMPERATURE: 97.6 F

## 2021-12-14 DIAGNOSIS — N91.1 SECONDARY AMENORRHEA: ICD-10-CM

## 2021-12-14 PROCEDURE — 99212 OFFICE O/P EST SF 10 MIN: CPT | Mod: 25

## 2021-12-14 PROCEDURE — 76830 TRANSVAGINAL US NON-OB: CPT

## 2021-12-18 ENCOUNTER — LABORATORY RESULT (OUTPATIENT)
Age: 31
End: 2021-12-18

## 2021-12-20 NOTE — ASU PATIENT PROFILE, ADULT - FALL HARM RISK - UNIVERSAL INTERVENTIONS
Bed in lowest position, wheels locked, appropriate side rails in place/Call bell, personal items and telephone in reach/Instruct patient to call for assistance before getting out of bed or chair/Non-slip footwear when patient is out of bed/Bells to call system/Physically safe environment - no spills, clutter or unnecessary equipment/Purposeful Proactive Rounding/Room/bathroom lighting operational, light cord in reach

## 2021-12-21 ENCOUNTER — OUTPATIENT (OUTPATIENT)
Dept: OUTPATIENT SERVICES | Facility: HOSPITAL | Age: 31
LOS: 1 days | Discharge: HOME | End: 2021-12-21
Payer: COMMERCIAL

## 2021-12-21 ENCOUNTER — RESULT REVIEW (OUTPATIENT)
Age: 31
End: 2021-12-21

## 2021-12-21 VITALS
WEIGHT: 125 LBS | RESPIRATION RATE: 18 BRPM | HEIGHT: 67 IN | DIASTOLIC BLOOD PRESSURE: 65 MMHG | HEART RATE: 85 BPM | TEMPERATURE: 98 F | SYSTOLIC BLOOD PRESSURE: 110 MMHG | OXYGEN SATURATION: 99 %

## 2021-12-21 VITALS — RESPIRATION RATE: 15 BRPM | OXYGEN SATURATION: 98 %

## 2021-12-21 DIAGNOSIS — Z98.890 OTHER SPECIFIED POSTPROCEDURAL STATES: Chronic | ICD-10-CM

## 2021-12-21 PROCEDURE — 88304 TISSUE EXAM BY PATHOLOGIST: CPT | Mod: 26

## 2021-12-21 RX ORDER — SODIUM CHLORIDE 9 MG/ML
1000 INJECTION, SOLUTION INTRAVENOUS
Refills: 0 | Status: DISCONTINUED | OUTPATIENT
Start: 2021-12-21 | End: 2022-01-04

## 2021-12-21 RX ORDER — DOXYCYCLINE HYCLATE 100 MG/1
100 TABLET ORAL
Qty: 10 | Refills: 0 | Status: ACTIVE | COMMUNITY
Start: 2021-12-21 | End: 1900-01-01

## 2021-12-21 RX ADMIN — SODIUM CHLORIDE 100 MILLILITER(S): 9 INJECTION, SOLUTION INTRAVENOUS at 10:42

## 2021-12-21 NOTE — H&P PST ADULT - NSICDXFAMILYHX_GEN_ALL_CORE_FT
FAMILY HISTORY:  Mother  Still living? Unknown  Family history of diabetes mellitus, Age at diagnosis: Age Unknown    Grandparent  Still living? Unknown  Family history of skin cancer, Age at diagnosis: Age Unknown

## 2021-12-21 NOTE — ASU DISCHARGE PLAN (ADULT/PEDIATRIC) - CARE PROVIDER_API CALL
Bernardo Deras)  Obstetrics and Gynecology  [address] [place] [address]  Phone: [phone]  Fax: [phone]  Established Patient  Follow Up Time: 2 weeks

## 2021-12-21 NOTE — CHART NOTE - NSCHARTNOTEFT_GEN_A_CORE
ANESTHESIA to PACU NOTE      ____ Intubated  TV:______       Rate: ______      FiO2: ______    __x__ Patent Airway    __x__ Full return of protective reflexes    ____ Full recovery from anesthesia / sedation to baseline status    Vitals:  HR 85  /61  RR 15  O2sat. 99%  Temp: 36.5C      Mental Status:  _x___ Awake   _____ Alert   ___x__ Drowsy   _____ Sedated  x  Nausea/Vomiting: ____ Yes, See Post - Op Orders      _x___ No    Pain Scale (0-10): _____    Treatment: __x__ None    ____ See Post - Op/PCA Orders    Post - Operative Fluids:   ____ Oral   _x___ See Post - Op Orders    Plan:  Discharge to:   _x___Home       _____Floor      _____Critical Care    _____ Other:_________________    Comments: s/p general anesthesia with LMA. No anesthesia complications. Pt's condition is stable in PACU. Full report is given to PACU RN.

## 2021-12-21 NOTE — H&P PST ADULT - BIRTH SEX
"Gemma Cowart is a 71 year old female who presents for:  Chief Complaint   Patient presents with     Oncology Clinic Visit     Ret Breast Ca         Initial Vitals:  /70  Pulse 99  Temp 98.3  F (36.8  C) (Oral)  Resp 17  Wt 91.6 kg (202 lb)  LMP 09/24/1990  SpO2 97%  BMI 35.5 kg/m2 Estimated body mass index is 35.5 kg/(m^2) as calculated from the following:    Height as of 11/15/16: 1.607 m (5' 3.25\").    Weight as of this encounter: 91.6 kg (202 lb).. Body surface area is 2.02 meters squared. BP completed using cuff size: regular  No Pain (0) Patient's last menstrual period was 09/24/1990. Allergies and medications reviewed.     Medications: Medication refills not needed today.  Pharmacy name entered into [employer]:    [facility] [id number] [place] [address] & [facility] - [address].    Comments: None    6 minutes for nursing intake (face to face time)   Lluvia Mishra CMA    DISCHARGE PLAN:   1.) Patient to be scheduled for labs and bone density in 6 months. Prep instructions reviewed for Bone density.  2.) Patient to be scheduled for follow up with Dr. Narvaez in 6 months.   Next appointments: See patient instruction section  Departure Mode: Ambulatory  Accompanied by: self  8 minutes for nursing discharge (face to face time)   Camila Rhodes RN            " Female

## 2021-12-21 NOTE — H&P PST ADULT - HISTORY OF PRESENT ILLNESS
32yo  at 7w5d GA dated by LMP 10/28/2021 presents for ETOP after IUD was found to be malpositioned on 2021. Denies abdominal pain or vaginal bleeding.

## 2021-12-21 NOTE — ASU DISCHARGE PLAN (ADULT/PEDIATRIC) - NS MD DC FALL RISK RISK
For information on Fall & Injury Prevention, visit: [email or web address] OR  [email or web address] OR  https://www.cdc.gov/steadi/patient.html

## 2021-12-21 NOTE — H&P PST ADULT - ASSESSMENT
A/P: 32yo  at 7w5d GA dated by LMP 10/28/2021 presents for ETOP  -IV access  -IVF hydration   -T&S  -on call to the OR    Dr. Deras aware

## 2021-12-22 LAB — SURGICAL PATHOLOGY STUDY: SIGNIFICANT CHANGE UP

## 2021-12-24 DIAGNOSIS — Z33.2 ENCOUNTER FOR ELECTIVE TERMINATION OF PREGNANCY: ICD-10-CM

## 2021-12-24 DIAGNOSIS — O34.591 MATERNAL CARE FOR OTHER ABNORMALITIES OF GRAVID UTERUS, FIRST TRIMESTER: ICD-10-CM

## 2021-12-24 DIAGNOSIS — Z88.0 ALLERGY STATUS TO PENICILLIN: ICD-10-CM

## 2021-12-30 ENCOUNTER — APPOINTMENT (OUTPATIENT)
Dept: OBGYN | Facility: CLINIC | Age: 31
End: 2021-12-30
Payer: COMMERCIAL

## 2021-12-30 VITALS — HEIGHT: 67 IN | TEMPERATURE: 97.8 F | WEIGHT: 125 LBS | BODY MASS INDEX: 19.62 KG/M2

## 2021-12-30 DIAGNOSIS — Z30.011 ENCOUNTER FOR INITIAL PRESCRIPTION OF CONTRACEPTIVE PILLS: ICD-10-CM

## 2021-12-30 DIAGNOSIS — Z33.2 ENCOUNTER FOR ELECTIVE TERMINATION OF PREGNANCY: ICD-10-CM

## 2021-12-30 PROCEDURE — 99024 POSTOP FOLLOW-UP VISIT: CPT

## 2022-03-25 RX ORDER — NORETHINDRONE ACETATE AND ETHINYL ESTRADIOL, ETHINYL ESTRADIOL AND FERROUS FUMARATE 1MG-10(24)
1 MG-10 MCG / KIT ORAL DAILY
Qty: 3 | Refills: 0 | Status: ACTIVE | COMMUNITY
Start: 2022-03-25 | End: 1900-01-01

## 2022-03-25 RX ORDER — NORETHINDRONE ACETATE AND ETHINYL ESTRADIOL, ETHINYL ESTRADIOL AND FERROUS FUMARATE 1MG-10(24)
1 MG-10 MCG / KIT ORAL
Qty: 84 | Refills: 0 | Status: ACTIVE | COMMUNITY
Start: 2021-12-30 | End: 1900-01-01

## 2023-03-17 ENCOUNTER — APPOINTMENT (OUTPATIENT)
Dept: OBGYN | Facility: CLINIC | Age: 33
End: 2023-03-17
Payer: COMMERCIAL

## 2023-03-17 VITALS — HEIGHT: 68 IN | WEIGHT: 120 LBS | BODY MASS INDEX: 18.19 KG/M2

## 2023-03-17 PROCEDURE — 99395 PREV VISIT EST AGE 18-39: CPT

## 2023-03-17 NOTE — DISCUSSION/SUMMARY
[FreeTextEntry1] : 31 yo , annual exam, doing well.\par \par - f/up pap, HPV\par - patient desiring Mirena IUD insertion\par - health care maintenance with PCP\par - return to office 1 month for IUD insertion

## 2023-03-17 NOTE — HISTORY OF PRESENT ILLNESS
[FreeTextEntry1] : 31 yo  w/ LMP 3/11/23 here for annual exam. No complaints. Has regular, non painful menses. She uses condoms for contraception but is interested in IUD. Sexually active with , no issues. No abdominal pain or urinary complaints. \par \par Last pap  NILM, HPV neg\par \par Ob hx: , FT NSVDx2, eTOP w/ d&c\par GYN hx: denies cysts, fibroids, abnormal paps, STIs\par PMH denies\par meds none\par allergy PCN\par PSH d&c, breast implants\par social denies\par fam hx denies

## 2023-03-21 LAB — HPV HIGH+LOW RISK DNA PNL CVX: NOT DETECTED

## 2023-03-28 ENCOUNTER — NON-APPOINTMENT (OUTPATIENT)
Age: 33
End: 2023-03-28

## 2023-03-28 LAB — CYTOLOGY CVX/VAG DOC THIN PREP: ABNORMAL

## 2023-04-13 ENCOUNTER — APPOINTMENT (OUTPATIENT)
Dept: OBGYN | Facility: CLINIC | Age: 33
End: 2023-04-13
Payer: COMMERCIAL

## 2023-04-13 VITALS — WEIGHT: 120 LBS | BODY MASS INDEX: 18.19 KG/M2 | HEIGHT: 68 IN

## 2023-04-13 DIAGNOSIS — Z30.430 ENCOUNTER FOR INSERTION OF INTRAUTERINE CONTRACEPTIVE DEVICE: ICD-10-CM

## 2023-04-13 LAB
BILIRUB UR QL STRIP: NORMAL
GLUCOSE UR-MCNC: NORMAL
HCG UR QL: 0.2 EU/DL
HGB UR QL STRIP.AUTO: NORMAL
KETONES UR-MCNC: NORMAL
LEUKOCYTE ESTERASE UR QL STRIP: NORMAL
NITRITE UR QL STRIP: NORMAL
PH UR STRIP: 7
PROT UR STRIP-MCNC: NORMAL
SP GR UR STRIP: 1.01

## 2023-04-13 PROCEDURE — 81003 URINALYSIS AUTO W/O SCOPE: CPT | Mod: QW

## 2023-04-13 PROCEDURE — 58300 INSERT INTRAUTERINE DEVICE: CPT

## 2023-04-13 NOTE — PROCEDURE
[IUD Placement] : intrauterine device (IUD) placement [Prevention of Pregnancy] : prevention of pregnancy [Risks] : risks [Benefits] : benefits [Alternatives] : alternatives [Patient] : patient [Infection] : infection [Bleeding] : bleeding [Pain] : pain [Expulsion] : expulsion [Failure] : failure [Uterine Perforation] : uterine perforation [Neg Pregnancy Test] : negative pregnancy test [Tenaculum] : Tenaculum [Easy Passage] : Easy passage [Sounded to ___ cm] : sounded to [unfilled] ~Ucm [Post Placement Transvag. US] : post placement transvaginal ultrasound [Mirena IUD] : Mirena IUD [Tolerated Well] : Patient tolerated the procedure well [No Complications] : No complications [Motrin/Ibuprofen] : Motrin/Ibuprofen [LMPDate] : 4/9/23 [de-identified] : [id number] [de-identified] : 2025 Apr [de-identified] : [id number]

## 2023-05-19 ENCOUNTER — APPOINTMENT (OUTPATIENT)
Dept: OBGYN | Facility: CLINIC | Age: 33
End: 2023-05-19
Payer: COMMERCIAL

## 2023-05-19 DIAGNOSIS — Z97.5 PRESENCE OF (INTRAUTERINE) CONTRACEPTIVE DEVICE: ICD-10-CM

## 2023-05-19 LAB
BILIRUB UR QL STRIP: NORMAL
GLUCOSE UR-MCNC: NORMAL
HCG UR QL: 0.2 EU/DL
HGB UR QL STRIP.AUTO: NORMAL
KETONES UR-MCNC: NORMAL
LEUKOCYTE ESTERASE UR QL STRIP: NORMAL
NITRITE UR QL STRIP: NORMAL
PH UR STRIP: 6.5
PROT UR STRIP-MCNC: NORMAL
SP GR UR STRIP: 1

## 2023-05-19 PROCEDURE — 81003 URINALYSIS AUTO W/O SCOPE: CPT | Mod: QW

## 2023-05-19 PROCEDURE — 93975 VASCULAR STUDY: CPT

## 2023-05-19 PROCEDURE — 99213 OFFICE O/P EST LOW 20 MIN: CPT

## 2023-05-19 PROCEDURE — 76830 TRANSVAGINAL US NON-OB: CPT

## 2023-05-19 NOTE — HISTORY OF PRESENT ILLNESS
[FreeTextEntry1] : 31 yo P2 w/ LMP beginning of May here for IUD check. Happy with this method of contraception. Last menses lasted 2 weeks but was very light. No issues with intercourse.

## 2023-05-19 NOTE — DISCUSSION/SUMMARY
[FreeTextEntry1] : 33 yo P2, Mirena IUD in place, doing well.\par \par - f/up next year for annual exam\par - IUD can remain until 2030 or fertility desired

## 2024-02-23 ENCOUNTER — APPOINTMENT (OUTPATIENT)
Dept: OBGYN | Facility: CLINIC | Age: 34
End: 2024-02-23
Payer: COMMERCIAL

## 2024-02-23 VITALS — WEIGHT: 125 LBS | HEIGHT: 68 IN | BODY MASS INDEX: 18.94 KG/M2

## 2024-02-23 DIAGNOSIS — Z01.419 ENCOUNTER FOR GYNECOLOGICAL EXAMINATION (GENERAL) (ROUTINE) W/OUT ABNORMAL FINDINGS: ICD-10-CM

## 2024-02-23 PROCEDURE — 99395 PREV VISIT EST AGE 18-39: CPT

## 2024-02-23 NOTE — HISTORY OF PRESENT ILLNESS
[FreeTextEntry1] : 32 yo P2 w/ LMP 2/5/24 here for annual exam. No complaints. Had Mirena IUD inserted 4/23 and happy with this method of contraception. had irregular spotting for about 8 months, now very minimal spotting. Sexually active wit , no issues. No abdominal pain or urinary complaints.   Last pap 2023 ASCUS, HPV neg

## 2024-02-23 NOTE — DISCUSSION/SUMMARY
[FreeTextEntry1] : 33 P2, Mirena IUD in place, annual exam.  - f/up pap, HPV, vaginitis  - Mirena IUD until 2030 - return to office 1yr annual exam or PRN

## 2024-02-23 NOTE — PHYSICAL EXAM
[Appropriately responsive] : appropriately responsive [Alert] : alert [No Acute Distress] : no acute distress [No Lymphadenopathy] : no lymphadenopathy [Regular Rate Rhythm] : regular rate rhythm [No Murmurs] : no murmurs [Clear to Auscultation B/L] : clear to auscultation bilaterally [Soft] : soft [Non-tender] : non-tender [Non-distended] : non-distended [No HSM] : No HSM [No Lesions] : no lesions [Oriented x3] : oriented x3 [No Mass] : no mass [Examination Of The Breasts] : a normal appearance [No Masses] : no breast masses were palpable [Labia Majora] : normal [Labia Minora] : normal [IUD String] : an IUD string was noted [Normal] : normal [Uterine Adnexae] : normal

## 2024-02-27 LAB — HPV HIGH+LOW RISK DNA PNL CVX: NOT DETECTED

## 2024-03-04 ENCOUNTER — NON-APPOINTMENT (OUTPATIENT)
Age: 34
End: 2024-03-04

## 2024-03-04 DIAGNOSIS — N76.0 ACUTE VAGINITIS: ICD-10-CM

## 2024-03-04 LAB
A VAGINAE DNA VAG QL NAA+PROBE: ABNORMAL
BVAB2 DNA VAG QL NAA+PROBE: NORMAL
C KRUSEI DNA VAG QL NAA+PROBE: NEGATIVE
C TRACH RRNA SPEC QL NAA+PROBE: NEGATIVE
CANDIDA DNA VAG QL NAA+PROBE: NEGATIVE
CYTOLOGY CVX/VAG DOC THIN PREP: NORMAL
MEGA1 DNA VAG QL NAA+PROBE: NORMAL
N GONORRHOEA RRNA SPEC QL NAA+PROBE: NEGATIVE
T VAGINALIS RRNA SPEC QL NAA+PROBE: NEGATIVE

## 2024-03-04 RX ORDER — METRONIDAZOLE 500 MG/1
500 TABLET ORAL
Qty: 14 | Refills: 0 | Status: ACTIVE | COMMUNITY
Start: 2024-03-04 | End: 1900-01-01

## 2025-02-07 ENCOUNTER — APPOINTMENT (OUTPATIENT)
Dept: OBGYN | Facility: CLINIC | Age: 35
End: 2025-02-07
Payer: COMMERCIAL

## 2025-02-07 VITALS
HEIGHT: 68 IN | DIASTOLIC BLOOD PRESSURE: 83 MMHG | WEIGHT: 125 LBS | SYSTOLIC BLOOD PRESSURE: 124 MMHG | BODY MASS INDEX: 18.94 KG/M2

## 2025-02-07 DIAGNOSIS — Z01.419 ENCOUNTER FOR GYNECOLOGICAL EXAMINATION (GENERAL) (ROUTINE) W/OUT ABNORMAL FINDINGS: ICD-10-CM

## 2025-02-07 LAB
APPEARANCE: CLEAR
BILIRUBIN URINE: NEGATIVE
BLOOD URINE: NEGATIVE
COLOR: YELLOW
GLUCOSE QUALITATIVE U: NEGATIVE
KETONES URINE: NEGATIVE
LEUKOCYTE ESTERASE URINE: NEGATIVE
NITRITE URINE: NEGATIVE
PH URINE: 8
PROTEIN URINE: NEGATIVE
SPECIFIC GRAVITY URINE: 1.01
UROBILINOGEN URINE: 0.2 (ref 0.2–?)

## 2025-02-07 PROCEDURE — 99395 PREV VISIT EST AGE 18-39: CPT

## 2025-02-10 LAB — HPV HIGH+LOW RISK DNA PNL CVX: NOT DETECTED
